# Patient Record
Sex: FEMALE | Race: WHITE | NOT HISPANIC OR LATINO | URBAN - METROPOLITAN AREA
[De-identification: names, ages, dates, MRNs, and addresses within clinical notes are randomized per-mention and may not be internally consistent; named-entity substitution may affect disease eponyms.]

---

## 2018-01-08 ENCOUNTER — OUTPATIENT (OUTPATIENT)
Dept: OUTPATIENT SERVICES | Facility: HOSPITAL | Age: 43
LOS: 1 days | End: 2018-01-08

## 2018-01-08 DIAGNOSIS — Z00.00 ENCOUNTER FOR GENERAL ADULT MEDICAL EXAMINATION WITHOUT ABNORMAL FINDINGS: ICD-10-CM

## 2019-03-06 PROBLEM — Z00.00 ENCOUNTER FOR PREVENTIVE HEALTH EXAMINATION: Status: ACTIVE | Noted: 2019-03-06

## 2019-03-19 ENCOUNTER — OUTPATIENT (OUTPATIENT)
Dept: OUTPATIENT SERVICES | Facility: HOSPITAL | Age: 44
LOS: 1 days | End: 2019-03-19
Payer: COMMERCIAL

## 2019-03-19 ENCOUNTER — APPOINTMENT (OUTPATIENT)
Dept: MRI IMAGING | Facility: CLINIC | Age: 44
End: 2019-03-19
Payer: COMMERCIAL

## 2019-03-19 PROCEDURE — 73221 MRI JOINT UPR EXTREM W/O DYE: CPT

## 2019-03-19 PROCEDURE — 73221 MRI JOINT UPR EXTREM W/O DYE: CPT | Mod: 26,RT

## 2019-03-22 ENCOUNTER — APPOINTMENT (OUTPATIENT)
Dept: ORTHOPEDIC SURGERY | Facility: CLINIC | Age: 44
End: 2019-03-22
Payer: COMMERCIAL

## 2019-03-22 VITALS — RESPIRATION RATE: 16 BRPM

## 2019-03-22 DIAGNOSIS — Z78.9 OTHER SPECIFIED HEALTH STATUS: ICD-10-CM

## 2019-03-22 PROCEDURE — 99203 OFFICE O/P NEW LOW 30 MIN: CPT

## 2019-06-07 ENCOUNTER — APPOINTMENT (OUTPATIENT)
Dept: MRI IMAGING | Facility: CLINIC | Age: 44
End: 2019-06-07

## 2019-09-03 ENCOUNTER — OUTPATIENT (OUTPATIENT)
Dept: OUTPATIENT SERVICES | Facility: HOSPITAL | Age: 44
LOS: 1 days | End: 2019-09-03

## 2019-09-03 ENCOUNTER — APPOINTMENT (OUTPATIENT)
Dept: MRI IMAGING | Facility: CLINIC | Age: 44
End: 2019-09-03
Payer: COMMERCIAL

## 2019-09-03 PROCEDURE — 73221 MRI JOINT UPR EXTREM W/O DYE: CPT | Mod: 26,LT

## 2019-09-03 PROCEDURE — 72141 MRI NECK SPINE W/O DYE: CPT | Mod: 26

## 2019-09-18 ENCOUNTER — APPOINTMENT (OUTPATIENT)
Dept: ORTHOPEDIC SURGERY | Facility: CLINIC | Age: 44
End: 2019-09-18
Payer: COMMERCIAL

## 2019-09-18 VITALS — HEIGHT: 64 IN | BODY MASS INDEX: 23.9 KG/M2 | WEIGHT: 140 LBS

## 2019-09-18 DIAGNOSIS — M25.562 PAIN IN LEFT KNEE: ICD-10-CM

## 2019-09-18 DIAGNOSIS — Z78.9 OTHER SPECIFIED HEALTH STATUS: ICD-10-CM

## 2019-09-18 PROCEDURE — 99213 OFFICE O/P EST LOW 20 MIN: CPT

## 2019-09-18 NOTE — PHYSICAL EXAM
[de-identified] : General: Well-nourished, well-developed, alert, and in no acute distress.\par Head: Normocephalic.\par Eyes: Pupils equal round reactive to light and accommodation, extraocular muscles intact, normal sclera.\par Nose: No nasal discharge.\par Cardiac: Regular rate. Extremities are warm and well perfused. Distal pulses are symmetric bilaterally.\par Respiratory: No labored breathing.\par Extremities: Sensation is intact distally bilaterally.  Distal pulses are symmetric bilaterally\par Neurologic: No focal deficits\par Skin: Normal skin color, texture, and turgor\par Psychiatric: Normal affect\par \par RIGHT Knee:\par \par Inspection: MILD BRUISING AT HER MEDIAL KNEE WITHOUT HEMATOMA FORMATION, NO RASH OR DEFORMITY\par Joint Effusion: no\par Range of Motion: Knee flexion 130 degrees, Knee extension 0 degrees\par Palpation: no joint line tenderness, no patellar facet tenderness, no tenderness at femoral condyle, no tenderness at tibial plateau, no patella tendon tenderness, no warmth\par Distal Pulses: Intact\par Lower extremity strength: Knee Extension 5/5 with no pain, Knee Flexion 5/5 with no pain, Hip Flexion 5/5 with no pain, Hip Abduction 5/5 with no pain \par Lower extremity reflexes: 2 Plus \par Lower extremity sensation: Intact\par \par Special Tests: \par Pham medial negative, Pham laterally negative\par Anterior Drawer: negative\par Posterior Drawer: negative\par Anterior Lachman: negative\par Varus Stress: negative with no gapping\par Valgus Stress: negative with no gapping\par \par LEFT Knee:\par \par Inspection: MODERATE BRUISING AT HER MEDIAL KNEE - LEVEL OF MEDIAL JOINT LINE/MEDIAL FEMORAL CONDYLE WITHOUT HEMATOMA FORMATION, NO RASH OR DEFORMITY\par Joint Effusion: no\par Range of Motion: Knee flexion 130 degrees WITH MILD PAIN, Knee extension 0 degrees\par Palpation: MILD MEDIAL joint line tenderness, no patellar facet tenderness, no tenderness at femoral condyle, no tenderness at tibial plateau, no patella tendon tenderness, no warmth\par Distal Pulses: Intact\par Lower extremity strength: Knee Extension 5/5 with NO pain, Knee Flexion 5/5 with MILD pain, Hip Flexion 5/5 with no pain, Hip Abduction 5/5 with no pain \par Lower extremity reflexes: 2 Plus \par Lower extremity sensation: Intact\par \par Special Tests: \par Pham medial negative, Pham laterally negative\par Anterior Drawer: negative\par Posterior Drawer: negative\par Anterior Lachman: negative\par Varus Stress: negative with no gapping\par Valgus Stress: negative with no gapping\par \par \par \par  [de-identified] : PATIENT DEFERS XRAY TODAY - PER PATIENT, HAD XRAY AT [facility] IN [facility], AND XRAYS NEGATIVE

## 2019-09-18 NOTE — HISTORY OF PRESENT ILLNESS
[de-identified] : The patient is a 44 year old woman presenting with knee pain.\par \par The patient is a 44-year-old woman presenting with a one-week history of left medial knee pain and bruising. She is an operating room nurse. One week ago, she was playing a game with friends which involved swimming objects at their lower extremity.  Afterwards the objects hit her left medial knee, she developed acute pain, swelling and bruising. She denies mechanical symptoms or suprapatellar effusion.  She's been having pain with deep knee flexion, but has no restriction in range of motion.  To the ER at [facility] in [facility], where x-rays were performed. Apparently x-rays were negative. Patient defers repeat x-rays today. She was placed in an immobilizer and sent for referral today.\par \par  [0] : a current pain level of 0/10

## 2019-09-18 NOTE — DISCUSSION/SUMMARY
[Medication Risks Reviewed] : Medication risks reviewed [de-identified] : The patient is a 44-year-old woman presenting with acute, traumatic left medial knee pain. Her pain as a result of blunt force trauma. She has no signs of instability or restriction of range of motion today. Her pain is likely secondary to knee contusion/distal quadriceps contusion.\par ------------------------------------------------------------------------------------------------------------------------------------------------------------------------------------\par Treatment options were discussed with the patient in detail including observation, protection-rest-ice-compression-elevation, activity modification, bracing, physical therapy, home exercise program.\par \par Patient was also on activity modification and rest. She was instructed to compress the area. She may wean out of the knee immobilizer. She was prescribed a course of naproxen.Appropriate use of medication was reviewed with the patient in detail. Risks, benefits, and adverse effects medication were discussed.\par \par If pain persists, the patient was instructed to followup in the next 2-3 weeks.\par \par -------------------------------------------------------------------------------------------------------------------------------------------\par Patient appreciates and agrees with current plan.  All of the patient's questions were answered appropriately.\par \par This note was generated using dragon medical dictation software.  A reasonable effort has been made for proofreading its contents, but typos may still remain.  If there are any questions or points of clarification needed please notify my office.

## 2019-12-13 ENCOUNTER — APPOINTMENT (OUTPATIENT)
Dept: ORTHOPEDIC SURGERY | Facility: CLINIC | Age: 44
End: 2019-12-13

## 2020-04-26 ENCOUNTER — MESSAGE (OUTPATIENT)
Age: 45
End: 2020-04-26

## 2020-05-07 LAB
SARS-COV-2 IGG SERPL IA-ACNC: <0.1 INDEX
SARS-COV-2 IGG SERPL QL IA: NEGATIVE

## 2020-09-08 ENCOUNTER — APPOINTMENT (OUTPATIENT)
Dept: ORTHOPEDIC SURGERY | Facility: CLINIC | Age: 45
End: 2020-09-08
Payer: COMMERCIAL

## 2020-09-08 VITALS — HEIGHT: 65.35 IN | RESPIRATION RATE: 16 BRPM | BODY MASS INDEX: 23.59 KG/M2 | WEIGHT: 143.3 LBS

## 2020-09-08 PROCEDURE — 99214 OFFICE O/P EST MOD 30 MIN: CPT

## 2020-09-08 PROCEDURE — 73140 X-RAY EXAM OF FINGER(S): CPT | Mod: F1

## 2020-09-16 ENCOUNTER — APPOINTMENT (OUTPATIENT)
Dept: ORTHOPEDIC SURGERY | Facility: CLINIC | Age: 45
End: 2020-09-16
Payer: COMMERCIAL

## 2020-09-16 VITALS — HEIGHT: 65.35 IN | WEIGHT: 143.3 LBS | BODY MASS INDEX: 23.59 KG/M2 | RESPIRATION RATE: 16 BRPM

## 2020-09-16 PROCEDURE — 99214 OFFICE O/P EST MOD 30 MIN: CPT

## 2020-10-28 ENCOUNTER — APPOINTMENT (OUTPATIENT)
Dept: ORTHOPEDIC SURGERY | Facility: CLINIC | Age: 45
End: 2020-10-28

## 2020-10-28 VITALS — BODY MASS INDEX: 23.82 KG/M2 | RESPIRATION RATE: 16 BRPM | HEIGHT: 65 IN | WEIGHT: 143 LBS

## 2022-01-12 ENCOUNTER — NON-APPOINTMENT (OUTPATIENT)
Age: 47
End: 2022-01-12

## 2022-01-12 ENCOUNTER — APPOINTMENT (OUTPATIENT)
Dept: OPHTHALMOLOGY | Facility: CLINIC | Age: 47
End: 2022-01-12
Payer: COMMERCIAL

## 2022-01-12 PROCEDURE — 92134 CPTRZ OPH DX IMG PST SGM RTA: CPT

## 2022-01-12 PROCEDURE — 92004 COMPRE OPH EXAM NEW PT 1/>: CPT

## 2022-01-12 PROCEDURE — 92201 OPSCPY EXTND RTA DRAW UNI/BI: CPT

## 2023-05-16 ENCOUNTER — APPOINTMENT (OUTPATIENT)
Dept: ORTHOPEDIC SURGERY | Facility: CLINIC | Age: 48
End: 2023-05-16
Payer: COMMERCIAL

## 2023-05-16 VITALS — BODY MASS INDEX: 23.49 KG/M2 | WEIGHT: 141 LBS | HEIGHT: 65 IN

## 2023-05-16 DIAGNOSIS — S80.02XA CONTUSION OF LEFT KNEE, INITIAL ENCOUNTER: ICD-10-CM

## 2023-05-16 DIAGNOSIS — S61.211D LACERATION W/OUT FOREIGN BODY OF LEFT INDEX FINGER W/OUT DAMAGE TO NAIL, SUBSEQUENT ENCOUNTER: ICD-10-CM

## 2023-05-16 DIAGNOSIS — Z87.39 PERSONAL HISTORY OF OTHER DISEASES OF THE MUSCULOSKELETAL SYSTEM AND CONNECTIVE TISSUE: ICD-10-CM

## 2023-05-16 DIAGNOSIS — M67.911 UNSPECIFIED DISORDER OF SYNOVIUM AND TENDON, RIGHT SHOULDER: ICD-10-CM

## 2023-05-16 DIAGNOSIS — M75.31 CALCIFIC TENDINITIS OF RIGHT SHOULDER: ICD-10-CM

## 2023-05-16 DIAGNOSIS — M75.41 IMPINGEMENT SYNDROME OF RIGHT SHOULDER: ICD-10-CM

## 2023-05-16 PROCEDURE — 99214 OFFICE O/P EST MOD 30 MIN: CPT

## 2023-05-16 PROCEDURE — 73030 X-RAY EXAM OF SHOULDER: CPT | Mod: RT

## 2023-05-16 PROCEDURE — 72050 X-RAY EXAM NECK SPINE 4/5VWS: CPT

## 2023-05-16 RX ORDER — NAPROXEN 250 MG/1
250 TABLET ORAL
Qty: 60 | Refills: 1 | Status: COMPLETED | COMMUNITY
Start: 2019-09-18 | End: 2023-05-16

## 2023-05-16 RX ORDER — DICLOFENAC EPOLAMINE 0.01 G/1
1.3 SYSTEM TOPICAL TWICE DAILY
Qty: 60 | Refills: 1 | Status: ACTIVE | COMMUNITY
Start: 2023-05-16 | End: 1900-01-01

## 2023-05-16 RX ORDER — DICLOFENAC SODIUM 50 MG/1
50 TABLET, DELAYED RELEASE ORAL TWICE DAILY
Qty: 30 | Refills: 0 | Status: ACTIVE | COMMUNITY
Start: 2023-05-16 | End: 1900-01-01

## 2023-05-16 RX ORDER — CEPHALEXIN 500 MG/1
500 CAPSULE ORAL 4 TIMES DAILY
Qty: 20 | Refills: 0 | Status: COMPLETED | COMMUNITY
Start: 2020-09-08 | End: 2023-05-16

## 2023-05-16 NOTE — PHYSICAL EXAM
[UE] : Sensory: Intact in bilateral upper extremities [Normal RUE] : Right Upper Extremity: No scars, rashes, lesions, ulcers, skin intact [Normal LUE] : Left Upper Extremity: No scars, rashes, lesions, ulcers, skin intact [Normal Touch] : sensation intact for touch [Normal] : Oriented to person, place, and time, insight and judgement were intact and the affect was normal [de-identified] : RIGHT shoulder and cervical spine\par Intact range of motion cervical spine with mild discomfort.\par Mild tenderness right cervical spine and trapezius.\par Right shoulder is with mild lateral tenderness.\par No edema, ecchymoses, erythema, deformity.\par Shoulder active range of motion is with 180 degrees forward elevation with pain but no drop arm or shoulder shrug.\par Internal rotation to T10.  70 degrees external rotation with the arm at the side.\par In 90 degrees abduction there is 90 degrees external rotation and 35 degrees internal rotation with pain on rotation.\par Pain with Neer and Ferguson.\par 5/5 supraspinatus, internal rotation, external rotation, biceps.\par Normal neurovascular exam distally. [de-identified] : \par \par X-rays taken today of RIGHT shoulder AP, Y lateral and axillary views showed calcific density near the infraspinatus more likely than supraspinatus laterally seen on the AP and Y lateral views.  Reversal of normal lordosis and degenerative disc disease no elevation humeral head.\par \par X-rays of the cervical spine AP and lateral views today showed especially at C5-C6 and C6-C7 levels\par \par MR SHOULDER LT \par PROCEDURE DATE: 09/03/2019 \par INTERPRETATION: MRI of the left shoulder \par Technique: Axial and coronal proton density and fat-suppressed T2 and \par sagittal proton density with and without fat suppression imaging was \par performed. \par Clinical indication: Left neck and shoulder pain. \par Findings: \par There is mild T1, T2 isointensity within the supraspinatus tendon consistent \par with tendinopathy. The tendons of the rotator cuff are otherwise intact. \par Subtle T2 hyperintensity of the supraspinatus muscle near the \par musculotendinous junction otherwise the muscle bulk of the shoulder is \par preserved. \par The biceps tendon is located within the bicipital groove. The \par intra-articular portion of the long head and its anchor are unremarkable. \par There is no glenohumeral effusion or distention of the \par subacromial/subdeltoid bursa. \par Evaluation of the anterior and posterior labrum demonstrates no significant \par abnormality. . There is no displaced labral tear. No SLAP tear. The \par glenohumeral articular cartilage is intact. No focal chondral injury is \par identified. \par No adhesive capsulitis. \par The acromioclavicular joint is intact. Bones are unremarkable. \par Impression: \par No rotator cuff tear either partial-thickness or full-thickness \par Supraspinatus muscle grade 1 strain.\par \par MR C spine 9/3/19\par IMPRESSION: \par C6-7 moderate left neural foraminal narrowing due to degenerative changes. \par Mild spinal canal stenosis at C5-6 and C6-7

## 2023-05-16 NOTE — HISTORY OF PRESENT ILLNESS
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 There was no injury it came on out of nowhere, although she does have a 7 month old baby that he has been carrying around. She has pain lifting, rotating shoulder. Sleeping on her RIGHT side is painful as well as getting dressed. She rates the pain at a 6/10 and somewhat localized. She does not take any pain medications. \par She also has a history of neck pain and feels that it might be related. She has some discomfort and stiffness.  No numbness or tingling.\par She has a history of LEFT rotator cuff strain several years ago that took about a year to get better. She did physical therapy.  She also had a steroid injection on the left side once but it did not seem to help.  In times of pain got better.

## 2023-05-16 NOTE — ASSESSMENT
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  There is calcific tendinitis evident on x-rays.  She does not have the really intense pain and loss of motion that some patients will present when they have this condition but she is having a lot of pain.  She will try an anti-inflammatory either Voltaren patches versus pills.  She preferred to try the patch first.  Otherwise if she is not getting better with that and exercises and physical therapy then she can go for an ultrasound-guided aspiration and steroid injection for the calcific tendinitis.\par Follow-up in about 6 weeks if she is not better.  If pain is persisting we might get an MRI to evaluate further.  There can be an association of these calcific densities with tears in the rotator cuff.\par She also has cervical spondylosis which may cause the neck pain.  Currently she is not having radicular symptoms.

## 2023-06-06 NOTE — PHYSICAL EXAM
[UE] : Sensory: Intact in bilateral upper extremities [Normal RUE] : Right Upper Extremity: No scars, rashes, lesions, ulcers, skin intact [Normal LUE] : Left Upper Extremity: No scars, rashes, lesions, ulcers, skin intact [Normal Touch] : sensation intact for touch [Normal] : Oriented to person, place, and time, insight and judgement were intact and the affect was normal [de-identified] : RIGHT shoulder and cervical spine\par Intact range of motion cervical spine with mild discomfort.\par Mild tenderness right cervical spine and trapezius.\par Right shoulder is with mild lateral tenderness.\par No edema, ecchymoses, erythema, deformity.\par Shoulder active range of motion is with 180 degrees forward elevation with pain but no drop arm or shoulder shrug.\par Internal rotation to T10.  70 degrees external rotation with the arm at the side.\par In 90 degrees abduction there is 90 degrees external rotation and 35 degrees internal rotation with pain on rotation.\par Pain with Neer and Ferguson.\par 5/5 supraspinatus, internal rotation, external rotation, biceps.\par Normal neurovascular exam distally. [de-identified] : \par \par X-rays taken 5/16/23 of RIGHT shoulder AP, Y lateral and axillary views showed calcific density near the infraspinatus more likely than supraspinatus laterally seen on the AP and Y lateral views.  Reversal of normal lordosis and degenerative disc disease no elevation humeral head.\par \par X-rays of the cervical spine AP and lateral views today showed especially at C5-C6 and C6-C7 levels\par \par MR SHOULDER LT \par PROCEDURE DATE: 09/03/2019 \par INTERPRETATION: MRI of the left shoulder \par Technique: Axial and coronal proton density and fat-suppressed T2 and \par sagittal proton density with and without fat suppression imaging was \par performed. \par Clinical indication: Left neck and shoulder pain. \par Findings: \par There is mild T1, T2 isointensity within the supraspinatus tendon consistent \par with tendinopathy. The tendons of the rotator cuff are otherwise intact. \par Subtle T2 hyperintensity of the supraspinatus muscle near the \par musculotendinous junction otherwise the muscle bulk of the shoulder is \par preserved. \par The biceps tendon is located within the bicipital groove. The \par intra-articular portion of the long head and its anchor are unremarkable. \par There is no glenohumeral effusion or distention of the \par subacromial/subdeltoid bursa. \par Evaluation of the anterior and posterior labrum demonstrates no significant \par abnormality. . There is no displaced labral tear. No SLAP tear. The \par glenohumeral articular cartilage is intact. No focal chondral injury is \par identified. \par No adhesive capsulitis. \par The acromioclavicular joint is intact. Bones are unremarkable. \par Impression: \par No rotator cuff tear either partial-thickness or full-thickness \par Supraspinatus muscle grade 1 strain.\par \par MR C spine 9/3/19\par IMPRESSION: \par C6-7 moderate left neural foraminal narrowing due to degenerative changes. \par Mild spinal canal stenosis at C5-6 and C6-7

## 2023-06-06 NOTE — HISTORY OF PRESENT ILLNESS
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 There was no injury it came on out of nowhere, although she does have a 7 month old baby that he has been carrying around. \par \par She had ultrasound on 5/31/23

## 2023-06-06 NOTE — ASSESSMENT
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  There is calcific tendinitis evident on x-rays.

## 2023-06-07 ENCOUNTER — APPOINTMENT (OUTPATIENT)
Dept: ORTHOPEDIC SURGERY | Facility: CLINIC | Age: 48
End: 2023-06-07

## 2023-08-30 ENCOUNTER — APPOINTMENT (OUTPATIENT)
Dept: OPHTHALMOLOGY | Facility: CLINIC | Age: 48
End: 2023-08-30
Payer: COMMERCIAL

## 2023-08-30 ENCOUNTER — NON-APPOINTMENT (OUTPATIENT)
Age: 48
End: 2023-08-30

## 2023-08-30 PROCEDURE — 92014 COMPRE OPH EXAM EST PT 1/>: CPT

## 2023-08-30 PROCEDURE — 92201 OPSCPY EXTND RTA DRAW UNI/BI: CPT

## 2023-08-30 PROCEDURE — 92134 CPTRZ OPH DX IMG PST SGM RTA: CPT

## 2024-05-20 ENCOUNTER — OUTPATIENT (OUTPATIENT)
Dept: OUTPATIENT SERVICES | Facility: HOSPITAL | Age: 49
LOS: 1 days | End: 2024-05-20

## 2024-05-20 ENCOUNTER — APPOINTMENT (OUTPATIENT)
Dept: MRI IMAGING | Facility: CLINIC | Age: 49
End: 2024-05-20
Payer: COMMERCIAL

## 2024-05-20 PROCEDURE — 73221 MRI JOINT UPR EXTREM W/O DYE: CPT | Mod: 26,RT
